# Patient Record
Sex: FEMALE | URBAN - METROPOLITAN AREA
[De-identification: names, ages, dates, MRNs, and addresses within clinical notes are randomized per-mention and may not be internally consistent; named-entity substitution may affect disease eponyms.]

---

## 2021-04-09 ENCOUNTER — APPOINTMENT (RX ONLY)
Dept: URBAN - METROPOLITAN AREA CLINIC 23 | Facility: CLINIC | Age: 81
Setting detail: DERMATOLOGY
End: 2021-04-09

## 2021-04-09 VITALS — TEMPERATURE: 97.5 F

## 2021-04-09 DIAGNOSIS — Z41.9 ENCOUNTER FOR PROCEDURE FOR PURPOSES OTHER THAN REMEDYING HEALTH STATE, UNSPECIFIED: ICD-10-CM

## 2021-04-09 DIAGNOSIS — L82.0 INFLAMED SEBORRHEIC KERATOSIS: ICD-10-CM

## 2021-04-09 DIAGNOSIS — L81.4 OTHER MELANIN HYPERPIGMENTATION: ICD-10-CM

## 2021-04-09 PROCEDURE — ? PULSED-DYE LASER

## 2021-04-09 PROCEDURE — ? FILLERS

## 2021-04-09 PROCEDURE — 99212 OFFICE O/P EST SF 10 MIN: CPT | Mod: 25

## 2021-04-09 PROCEDURE — ? LIQUID NITROGEN

## 2021-04-09 PROCEDURE — 17110 DESTRUCTION B9 LES UP TO 14: CPT

## 2021-04-09 PROCEDURE — ? COUNSELING

## 2021-04-09 ASSESSMENT — LOCATION DETAILED DESCRIPTION DERM
LOCATION DETAILED: MIDDLE STERNUM
LOCATION DETAILED: MIDDLE STERNUM
LOCATION DETAILED: UPPER STERNUM
LOCATION DETAILED: RIGHT MEDIAL SUPERIOR CHEST

## 2021-04-09 ASSESSMENT — LOCATION SIMPLE DESCRIPTION DERM
LOCATION SIMPLE: CHEST
LOCATION SIMPLE: CHEST

## 2021-04-09 ASSESSMENT — LOCATION ZONE DERM
LOCATION ZONE: TRUNK
LOCATION ZONE: TRUNK

## 2021-04-09 NOTE — PROCEDURE: FILLERS
Brows Filler  Volume In Cc: 0
Additional Area 1 Location: chin lines, marionette lines (physician sample)
Additional Area 3 Volume In Cc: 1
Additional Area 2 Location: oral commissures ,marionettes lines ,perioral fine lines,
Include Cannula Information In Note?: No
Include Cannula Length?: 1.5 inch
Additional Area 4 Location: cheeks, marionette lines, lips
Additional Area 1 Location: vermilion lips and fine lines
Include Cannula Size?: 25G
Additional Area 3 Location: perioral fine lines, vermillion lips, NLFs. marionette lines upper lip lines
Additional Area 5 Location: glabella lines
Additional Area 4 Location: lateral mouth
Map Statment: See [address] for Complete Details
Anesthesia Type: 1% lidocaine with epinephrine
Lot #: [id number]
Additional Area 5 Location: cheeks (cannula used) tear throughs
Anesthesia Volume In Cc: 0.3
Expiration Date (Month Year): 2022-08-31
Lot #: [id number]
Detail Level: Detailed
Additional Area 5 Location: oral commissures, upper lip lines, vermillion lips
Additional Anesthesia Volume In Cc: 6
Include Cannula Brand?: DermaSculpt
Lot #: [id number]
Consent: Written consent obtained. Risks include but not limited to bruising, beading, irregular texture, ulceration, infection, allergic reaction, scar formation, incomplete augmentation, temporary nature, procedural pain.
Lot #: [id number]
Include Cannula Information In Note?: Yes
Post-Care Instructions: Patient instructed to apply ice to reduce swelling.
Expiration Date (Month Year): 07/20/21
Additional Area 2 Location: tear troughs (mixed with prp and cannula used)
Expiration Date (Month Year): 2021-12
Additional Area 1 Location: medial cheeks, and chin
Filler: Restylane Defyne
Additional Area 3 Location: marionette lines, perioral
Price (Use Numbers Only, No Special Characters Or $): 0.00

## 2021-04-09 NOTE — PROCEDURE: LIQUID NITROGEN
Duration Of Freeze Thaw-Cycle (Seconds): 10
Post-Care Instructions: I reviewed with the patient in detail post-care instructions. Patient is to wear sunprotection, and avoid picking at any of the treated lesions. Pt may apply Vaseline to crusted or scabbing areas.
Medical Necessity Information: It is in your best interest to select a reason for this procedure from the list below. All of these items fulfill various CMS LCD requirements except the new and changing color options.
Render Note In Bullet Format When Appropriate: No
Number Of Freeze-Thaw Cycles: 3 freeze-thaw cycles
Consent: The patient's consent was obtained including but not limited to risks of crusting, scabbing, blistering, scarring, darker or lighter pigmentary change, recurrence, incomplete removal and infection.
Medical Necessity Clause: This procedure was medically necessary because the lesions that were treated were:
Detail Level: Detailed

## 2021-04-09 NOTE — PROCEDURE: PULSED-DYE LASER
Immediate Endpoint: erythema
Pulse Duration: 3 ms
Post-Procedure Care: Post cosmetic injections
Consent: Written consent obtained, risks reviewed including but not limited to crusting, scabbing, blistering, scarring, darker or lighter pigmentary change, incidental hair removal, bruising, and/or incomplete removal.
Pulse Duration: 10 ms
Spot Size: 10 mm
Cryogen Time (Ms): [address] [address]
Fluence In J/Cm2 (Optional): 12.00
Location Override: post cosmetic bruising
Cryogen Time (Ms): 30
Immediate Endpoint: purpura
Post-Care Instructions: I reviewed with the patient in detail post-care instructions. Patient should stay away from the sun and wear sun protection until treated areas are fully healed.
Spot Size: 10x3 mm
Laser Type: Vbeam 595nm
Delay Time (Ms): [facility]
Cryogen Time (Ms): 27
Delay Time (Ms): 20
Delay Time (Ms): 10
Fluence In J/Cm2 (Optional): 6.50
Treated Area: medium area
Detail Level: Simple
Treated Area: large area
Delay Time (Ms): [address]
Spot Size: 7 mm

## 2021-04-21 ENCOUNTER — APPOINTMENT (RX ONLY)
Dept: URBAN - METROPOLITAN AREA CLINIC 23 | Facility: CLINIC | Age: 81
Setting detail: DERMATOLOGY
End: 2021-04-21

## 2021-04-21 VITALS — TEMPERATURE: 97.6 F

## 2021-04-21 DIAGNOSIS — L82.0 INFLAMED SEBORRHEIC KERATOSIS: ICD-10-CM

## 2021-04-21 DIAGNOSIS — Z41.9 ENCOUNTER FOR PROCEDURE FOR PURPOSES OTHER THAN REMEDYING HEALTH STATE, UNSPECIFIED: ICD-10-CM

## 2021-04-21 DIAGNOSIS — L71.0 PERIORAL DERMATITIS: ICD-10-CM

## 2021-04-21 PROCEDURE — ? COUNSELING

## 2021-04-21 PROCEDURE — 99213 OFFICE O/P EST LOW 20 MIN: CPT | Mod: 25

## 2021-04-21 PROCEDURE — ? PRESCRIPTION SAMPLES PROVIDED

## 2021-04-21 PROCEDURE — ? FILLERS

## 2021-04-21 PROCEDURE — ? LIQUID NITROGEN

## 2021-04-21 PROCEDURE — 17110 DESTRUCTION B9 LES UP TO 14: CPT

## 2021-04-21 ASSESSMENT — LOCATION SIMPLE DESCRIPTION DERM
LOCATION SIMPLE: RIGHT CHEEK
LOCATION SIMPLE: LEFT CHEEK

## 2021-04-21 ASSESSMENT — LOCATION ZONE DERM: LOCATION ZONE: FACE

## 2021-04-21 ASSESSMENT — LOCATION DETAILED DESCRIPTION DERM
LOCATION DETAILED: RIGHT INFERIOR CENTRAL MALAR CHEEK
LOCATION DETAILED: RIGHT CENTRAL MALAR CHEEK
LOCATION DETAILED: LEFT CENTRAL MALAR CHEEK

## 2021-04-21 NOTE — PROCEDURE: LIQUID NITROGEN
Medical Necessity Clause: This procedure was medically necessary because the lesions that were treated were:
Detail Level: Detailed
Post-Care Instructions: I reviewed with the patient in detail post-care instructions. Patient is to wear sunprotection, and avoid picking at any of the treated lesions. Pt may apply Vaseline to crusted or scabbing areas.
Add 52 Modifier (Optional): no
Medical Necessity Information: It is in your best interest to select a reason for this procedure from the list below. All of these items fulfill various CMS LCD requirements except the new and changing color options.
Number Of Freeze-Thaw Cycles: 3 freeze-thaw cycles
Consent: The patient's consent was obtained including but not limited to risks of crusting, scabbing, blistering, scarring, darker or lighter pigmentary change, recurrence, incomplete removal and infection.
Duration Of Freeze Thaw-Cycle (Seconds): 10

## 2021-04-21 NOTE — PROCEDURE: FILLERS
Additional Area 3 Volume In Cc: 0
Include Cannula Information In Note?: No
Additional Area 5 Location: lateral mouth ( physician sample )
Additional Area 4 Location: lateral mouth
Additional Area 5 Volume In Cc: 0.4
Lot #: [id number]
Additional Area 5 Location: cheeks (cannula used) tear throughs
Additional Area 1 Location: chin lines, marionette lines (physician sample)
Expiration Date (Month Year): 2023-03-31
Lot #: [id number]
Additional Area 5 Location: oral commissures, upper lip lines, vermillion lips
Include Cannula Brand?: DermaSculpt
Expiration Date (Month Year): 7854-3-39
Lot #: [id number]
Detail Level: Detailed
Anesthesia Type: 1% lidocaine with epinephrine
Additional Area 1 Location: glabellar fine lines, root of nose fine lines
Price (Use Numbers Only, No Special Characters Or $): 0.00
Expiration Date (Month Year): 2023-08
Anesthesia Volume In Cc: 0.3
Include Cannula Information In Note?: Yes
Additional Area 2 Location: tear troughs (cannula used). Using conventional needle to lateral jawline, NLFâs, lateral mouth.
Include Cannula Length?: 1.5 inch
Additional Area 1 Location: right lat cheek
Additional Anesthesia Volume In Cc: 6
Additional Area 3 Location: cheeks (cannula used) nfls
Filler: Restylane-L
Lot #: [id number]
Include Cannula Size?: 25G
Additional Area 2 Location: oral commissures ,marionettes lines ,perioral fine lines,
Post-Care Instructions: Patient instructed to apply ice to reduce swelling.
[facility]
Consent: Written consent obtained. Risks include but not limited to bruising, beading, irregular texture, ulceration, infection, allergic reaction, scar formation, incomplete augmentation, temporary nature, procedural pain.
Map Statment: See [address] for Complete Details
Additional Area 4 Location: oral commissures, marionette lines , chin
Additional Area 3 Location: perioral fine lines, vermillion lips, NLFs. marionette lines upper lip lines
Expiration Date (Month Year): 07/20/21

## 2021-04-21 NOTE — PROCEDURE: PRESCRIPTION SAMPLES PROVIDED
Detail Level: Zone
Samples Given: Zilxi foam
Lot/Batch Number (Optional): [id number]
Expiration Date (Optional): 04/21